# Patient Record
Sex: MALE | Race: WHITE | URBAN - METROPOLITAN AREA
[De-identification: names, ages, dates, MRNs, and addresses within clinical notes are randomized per-mention and may not be internally consistent; named-entity substitution may affect disease eponyms.]

---

## 2023-11-17 ENCOUNTER — OFFICE VISIT (OUTPATIENT)
Age: 68
End: 2023-11-17

## 2023-11-17 VITALS
HEART RATE: 66 BPM | SYSTOLIC BLOOD PRESSURE: 122 MMHG | HEIGHT: 68 IN | BODY MASS INDEX: 24.63 KG/M2 | WEIGHT: 162.5 LBS | RESPIRATION RATE: 16 BRPM | TEMPERATURE: 97.5 F | DIASTOLIC BLOOD PRESSURE: 72 MMHG | OXYGEN SATURATION: 98 %

## 2023-11-17 DIAGNOSIS — M51.16 LUMBAR DISC DISEASE WITH RADICULOPATHY: Primary | ICD-10-CM

## 2023-11-17 RX ORDER — LATANOPROST 50 UG/ML
SOLUTION/ DROPS OPHTHALMIC
COMMUNITY
Start: 2023-11-13

## 2023-11-17 RX ORDER — BRIMONIDINE TARTRATE 1 MG/ML
1 SOLUTION/ DROPS OPHTHALMIC
COMMUNITY
Start: 2022-03-14

## 2023-11-17 RX ORDER — DORZOLAMIDE HYDROCHLORIDE AND TIMOLOL MALEATE 20; 5 MG/ML; MG/ML
1 SOLUTION/ DROPS OPHTHALMIC 2 TIMES DAILY
COMMUNITY
Start: 2023-04-25

## 2023-11-17 SDOH — ECONOMIC STABILITY: HOUSING INSECURITY
IN THE LAST 12 MONTHS, WAS THERE A TIME WHEN YOU DID NOT HAVE A STEADY PLACE TO SLEEP OR SLEPT IN A SHELTER (INCLUDING NOW)?: NO

## 2023-11-17 SDOH — ECONOMIC STABILITY: FOOD INSECURITY: WITHIN THE PAST 12 MONTHS, YOU WORRIED THAT YOUR FOOD WOULD RUN OUT BEFORE YOU GOT MONEY TO BUY MORE.: NEVER TRUE

## 2023-11-17 SDOH — ECONOMIC STABILITY: INCOME INSECURITY: HOW HARD IS IT FOR YOU TO PAY FOR THE VERY BASICS LIKE FOOD, HOUSING, MEDICAL CARE, AND HEATING?: NOT HARD AT ALL

## 2023-11-17 SDOH — ECONOMIC STABILITY: FOOD INSECURITY: WITHIN THE PAST 12 MONTHS, THE FOOD YOU BOUGHT JUST DIDN'T LAST AND YOU DIDN'T HAVE MONEY TO GET MORE.: NEVER TRUE

## 2023-11-17 ASSESSMENT — PATIENT HEALTH QUESTIONNAIRE - PHQ9
SUM OF ALL RESPONSES TO PHQ QUESTIONS 1-9: 0
1. LITTLE INTEREST OR PLEASURE IN DOING THINGS: 0
2. FEELING DOWN, DEPRESSED OR HOPELESS: 0
SUM OF ALL RESPONSES TO PHQ QUESTIONS 1-9: 0
SUM OF ALL RESPONSES TO PHQ9 QUESTIONS 1 & 2: 0
SUM OF ALL RESPONSES TO PHQ QUESTIONS 1-9: 0
SUM OF ALL RESPONSES TO PHQ QUESTIONS 1-9: 0

## 2023-11-17 NOTE — PROGRESS NOTES
[facility]      Date of Visit:  2023  Patient Name: Arnol Sy   Patient :  1955     CHIEF COMPLAINT/HPI:     Arnol Sy is a 77 y.o. male who presents today for an general visit to be evaluated for the following condition(s):  Chief Complaint   Patient presents with    Check-Up     Patient is here today for back and hip pain both hips for 6-8 weeks now OTC medications not used    Over the last few years patient has had intermittent sharp stabbing pain radiating from the lumbar area to the groin right or left depending on his movement at the time. It would just be a jolt and then be gone and he be fine for a long period of time. Over the recent past he has noted increasing episodes of pain and while hiking felt sharp pain down the left leg which caused weakness he did fall at that time but he does not know if it was just because of the terrain or whether was because of the pain. At the present time he has no symptoms    REVIEW OF SYSTEM      Review of Systems   of fever no sweats no chills no incontinence no persistent weakness of the lower extremity    REVIEWED INFORMATION      No Known Allergies    Current Outpatient Medications   Medication Sig Dispense Refill    brimonidine (ALPHAGAN P) 0.1 % SOLN 1 drop      latanoprost (XALATAN) 0.005 % ophthalmic solution       dorzolamide-timolol (COSOPT) 2-0.5 % ophthalmic solution Apply 1 drop to eye 2 times daily      diclofenac (VOLTAREN) 50 MG EC tablet Take 1 tablet by mouth 2 times daily 60 tablet 1     No current facility-administered medications for this visit. There is no problem list on file for this patient. No past medical history on file. No past surgical history on file.      Social History     Socioeconomic History    Marital status: Unknown     Spouse name: None    Number of children: None    Years of education: None    Highest education level: None   Tobacco Use    Smoking status: Never    Smokeless

## 2023-11-20 ENCOUNTER — HOSPITAL ENCOUNTER (OUTPATIENT)
Age: 68
Setting detail: THERAPIES SERIES
Discharge: HOME | End: 2023-11-20
Payer: MEDICARE

## 2023-11-20 PROCEDURE — 97161 PT EVAL LOW COMPLEX 20 MIN: CPT

## 2023-11-20 PROCEDURE — 97110 THERAPEUTIC EXERCISES: CPT

## 2023-11-20 NOTE — CONSULTS
[] [address]  4600 [facility] [place] [facility].  [phone]  F: [phone] [] 204 [facility]  [address]  P: [phone]  F: [phone] [] [address]  151 [facility]  P: [phone]  F: [phone] [] [facility] [place]: [phone]  F: [phone] [] 224 [facility]  [address] [facility] [address]   P: [phone]  F: [phone]  [x] 26[address] [facility].   P: [phone]  F: [phone] [] 205 [facility]  [address]    [address]  P: [phone]  F: [phone] [] 224 [facility]  7[address] [facility]  [place]: [phone]  F: [phone] [] [address] [place]  [address]  [place]: [phone]  F: [phone]  [] 97 [facility]  [address] [facility] [address]  [place]: [phone]  F: [phone]     Physical Therapy Spine Evaluation    Date:  2023  Patient: Priscilla Solares  : 1955  MRN: [id number]  Physician: Edith Smith MD   Insurance: /MEDICAL vozero MEDICARE SUPPLEMENT:NO PRIOR AUTH REQUIRED, BASED ON MEDICAL NECESSITY PER MARISOL YR, MEDICAL MUTUAL COVERS PRIMARY DEDUCTIBLE AND 20% COINSURANCE, NO PATIENT LIABILITYMedical Diagnosis: M51.16 (ICD-10-CM) - Lumbar disc disease with radiculopathy  Rehab Codes: M54.16, S33.6XX, R26.9  Onset Date: 2023  Next 's appt.: 2023  Estimated  charges 23: $117.70  Subjective:   CC: B hip pain, dull ach LS and anterior  pelvis, at times shooting into

## 2023-11-22 ENCOUNTER — HOSPITAL ENCOUNTER (OUTPATIENT)
Age: 68
Setting detail: THERAPIES SERIES
Discharge: HOME | End: 2023-11-22
Payer: MEDICARE

## 2023-11-22 NOTE — FLOWSHEET NOTE
[x] Reviewed Prior HEP/Ed  Method of Education: [x] Verbal  [x] Demo  [x] Written  Comprehension of Education:  [x] Verbalizes understanding. [x] Demonstrates understanding. [] Needs review. [x] Demonstrates/verbalizes HEP/Ed previously given. Access Code: [email or web address]  URL: [email or web address]  Date: 11/22/2023  Prepared by: Ziyad Calabrese    Exercises  - Supine Transversus Abdominis Bracing - Hands on Stomach  - 1 x daily - 5 x weekly - 2 sets - 10 reps - 10 hold  - Hooklying Small March  - 1 x daily - 5 x weekly - 2 sets - 10 reps  - Modified Supine Leg Extension  - 1 x daily - 5 x weekly - 2 sets - 10 reps  - Supine Single Knee to Chest Stretch  - 1 x daily - 5 x weekly - 1 sets - 5 reps - 10 hold  - Supine Double Knee to Chest  - 1 x daily - 5 x weekly - 1 sets - 5 reps - 10 hold  - Supine Piriformis Stretch with Leg Straight  - 1 x daily - 5 x weekly - 1 sets - 5 reps - 10 hold  Plan: [x] Continue current frequency toward long and short term goals. [x] Specific Instructions for subsequent treatments: progress core strengthening flexion protocol, add extension as tolerated      Time In:1630            Time Out: 4229    Electronically signed by:   Ziyad Calabrese PT

## 2023-11-27 ENCOUNTER — HOSPITAL ENCOUNTER (OUTPATIENT)
Age: 68
Setting detail: THERAPIES SERIES
Discharge: HOME | End: 2023-11-27
Payer: MEDICARE

## 2023-11-27 PROCEDURE — 97110 THERAPEUTIC EXERCISES: CPT

## 2023-11-27 NOTE — FLOWSHEET NOTE
[] [address]  4600 [facility].  [phone]  F: [phone] [] 204 [facility]  [address]   [address]  P: [phone]  F: [phone] [] [address]  151 [facility]  P: [phone]  F: [phone] [] [place]: [phone]  F: [phone] [] 224 [facility]  One [facility]   [address]   P: [phone]  F: [phone]  [x] [address] [facility].   P: [phone]  F: [phone] [] 205 [facility]  [address] [place]    [address]  P: [phone]  F: [phone] [] 224 [facility]  [address] [facility]  [place]: [phone]  F: [phone] [] [address]  [place]: [phone]  F: [phone]      Physical Therapy Daily Treatment Note    Date:  2023  Patient Name:  Des Benson    :  1955  MRN: [id number]  Physician: Emmie Cardenas MD                                 Insurance: /MEDICAL [employer] MEDICARE SUPPLEMENT:NO PRIOR AUTH REQUIRED, BASED ON MEDICAL NECESSITY PER MARISOL YR, MEDICAL MUTUAL COVERS PRIMARY DEDUCTIBLE AND 20% COINSURANCE, NO PATIENT LIABILITYMedical Diagnosis: M51.16 (ICD-10-CM) - Lumbar disc disease with radiculopathy                      Rehab Codes: M54.16, S33.6XX, R26.9  Onset Date: 2023                      Next 's appt.: 2023  Estimated  charges 23: $259.53  Visit# / total visits: 3/18;                         Progress note for Medicare patient due at visit 10     Cancels/No Shows:

## 2023-11-29 ENCOUNTER — HOSPITAL ENCOUNTER (OUTPATIENT)
Age: 68
Setting detail: THERAPIES SERIES
Discharge: HOME | End: 2023-11-29
Payer: MEDICARE

## 2023-11-29 PROCEDURE — 97140 MANUAL THERAPY 1/> REGIONS: CPT

## 2023-11-29 PROCEDURE — 97110 THERAPEUTIC EXERCISES: CPT

## 2023-11-29 NOTE — FLOWSHEET NOTE
would continue to benefit from skilled physical therapy services in order to: address deficits as stated to restore difficulty initiating gait, sit to stand, lifting,       STG: (to be met in 10 treatments)  ? Pain: < 4/10 to facilitate ADL's  ? ROM: Lumbar extension to [facility] to facilitate standing  ? Strength: core to 4+/5 to facilitate lift and carry  ? Function: Patient able to perform sit to stand and walk without pain     LTG: (to be met in 18 treatments)  Improve KATHIA score to 0/100 to facilitate household chores         2. Patient to be independent with home exercise program as demonstrated by performance with correct form without cues. Patient goals:Abolish pain    Pt. Education:  [x] Yes  [] No  [x] Reviewed Prior HEP/Ed  Method of Education: [x] Verbal  [x] Demo  [x] Written  Comprehension of Education:  [x] Verbalizes understanding. [x] Demonstrates understanding. [] Needs review. [x] Demonstrates/verbalizes HEP/Ed previously given. Access Code: [email or web address]  URL: [employer]/  Date: 11/22/2023  Prepared by: Niya Rueda    Exercises  - Supine Transversus Abdominis Bracing - Hands on Stomach  - 1 x daily - 5 x weekly - 2 sets - 10 reps - 10 hold  - Hooklying Small March  - 1 x daily - 5 x weekly - 2 sets - 10 reps  - Modified Supine Leg Extension  - 1 x daily - 5 x weekly - 2 sets - 10 reps  - Supine Single Knee to Chest Stretch  - 1 x daily - 5 x weekly - 1 sets - 5 reps - 10 hold  - Supine Double Knee to Chest  - 1 x daily - 5 x weekly - 1 sets - 5 reps - 10 hold  - Supine Piriformis Stretch with Leg Straight  - 1 x daily - 5 x weekly - 1 sets - 5 reps - 10 hold  Plan: [x] Continue current frequency toward long and short term goals. [x] Specific Instructions for subsequent treatments: progress core strengthening flexion protocol, add extension as tolerated      Time In:0930            Time Out: 7538    Electronically signed by:   Niya Rueda PT

## 2023-12-04 ENCOUNTER — HOSPITAL ENCOUNTER (OUTPATIENT)
Age: 68
Setting detail: THERAPIES SERIES
Discharge: HOME | End: 2023-12-04
Payer: MEDICARE

## 2023-12-04 PROCEDURE — 97110 THERAPEUTIC EXERCISES: CPT

## 2023-12-04 PROCEDURE — 97140 MANUAL THERAPY 1/> REGIONS: CPT

## 2023-12-04 NOTE — FLOWSHEET NOTE
[] [address]  4600 [facility].  [phone]  F: [phone] [] 204 [facility]  [address]   [address]  P: [phone]  F: [phone] [] [address]  [address] [facility]  P: [phone]  F: [phone] [] [place]: [phone]  F: [phone] [] 224 [facility]  [address] [facility]   [address]   P: [phone]  F: [phone]  [x] [address] [facility].   P: [phone]  F: [phone] [] 205 [facility]  [address] [place]    [address]  P: [phone]  F: [phone] [] 224 [facility]  7[address] [facility]  [place]: [phone]  F: [phone] [] [address] [place]  [address]  [place]: [phone]  F: [phone]      Physical Therapy Daily Treatment Note    Date:  2023  Patient Name:  Tahmina Montero    :  1955  MRN: [id number]  Physician: Katie Chau MD                                 Insurance: /MEDICAL M[employer]UAL MEDICARE SUPPLEMENT:NO PRIOR AUTH REQUIRED, BASED ON MEDICAL NECESSITY PER MARISOL YR, MEDICAL MUTUAL COVERS PRIMARY DEDUCTIBLE AND 20% COINSURANCE, NO PATIENT LIABILITYMedical Diagnosis: M51.16 (ICD-10-CM) - Lumbar disc disease with radiculopathy                      Rehab Codes: M54.16, S33.6XX, R26.9  Onset Date: 2023                      Next 's appt.: 2023  Estimated MC charges 23: $399.93  Visit# / total visits: ;                         Progress note for Medicare patient due at visit 10     Cancels/No Shows:

## 2023-12-07 ENCOUNTER — HOSPITAL ENCOUNTER (OUTPATIENT)
Age: 68
Setting detail: THERAPIES SERIES
Discharge: HOME | End: 2023-12-07
Payer: MEDICARE

## 2023-12-07 PROCEDURE — 97110 THERAPEUTIC EXERCISES: CPT

## 2023-12-07 PROCEDURE — 97140 MANUAL THERAPY 1/> REGIONS: CPT

## 2023-12-07 NOTE — FLOWSHEET NOTE
*  [] [address]  4600 [facility] [place] [facility].  [phone]  F: [phone] [] 204 [facility]  [address]   [address]  P: [phone]  F: [phone] [] [address]  151 [facility]  P: [phone]  F: [phone] [] [place]: [phone]  F: [phone] [] [address]  One [facility]   [address]   P: [phone]  F: [phone]  [x] [address] [facility].   P: [phone]  F: [phone] [] 205 [facility]  [address] [place]    [address]  P: [phone]  F: [phone] [] 224 [facility]  7[address] [facility]  [place]: [phone]  F: [phone] [] [address] [place] [facility] [address]  [place]: [phone]  F: [phone]      Physical Therapy Daily Treatment Note    Date:  2023  Patient Name:  Sonja Davis    :  1955  MRN: [id number]  Physician: Lit Sheffield MD                                 Insurance: /MEDICAL [place] MEDICARE SUPPLEMENT:NO PRIOR AUTH REQUIRED, BASED ON MEDICAL NECESSITY PER MARISOL YR, MEDICAL MUTUAL COVERS PRIMARY DEDUCTIBLE AND 20% COINSURANCE, NO PATIENT LIABILITYMedical Diagnosis: M51.16 (ICD-10-CM) - Lumbar disc disease with radiculopathy                      Rehab Codes: M54.16, S33.6XX, R26.9  Onset Date: 2023                      Next 's appt.: 2023  Estimated MC charges 23: $470.13  Visit# / total visits: ;                         Progress note for Medicare patient due at visit 10     Cancels/No Shows:

## 2023-12-11 ENCOUNTER — HOSPITAL ENCOUNTER (OUTPATIENT)
Age: 68
Setting detail: THERAPIES SERIES
Discharge: HOME | End: 2023-12-11
Payer: MEDICARE

## 2023-12-11 PROCEDURE — 97110 THERAPEUTIC EXERCISES: CPT

## 2023-12-11 PROCEDURE — 97140 MANUAL THERAPY 1/> REGIONS: CPT

## 2023-12-11 NOTE — FLOWSHEET NOTE
strengthening flexion protocol, add extension as tolerated. Reassess iliosacral symmetry      Time In:1015            Time Out: 1100    Electronically signed by:   Viviana Rodriguez PT

## 2023-12-14 ENCOUNTER — HOSPITAL ENCOUNTER (OUTPATIENT)
Age: 68
Setting detail: THERAPIES SERIES
Discharge: HOME | End: 2023-12-14
Payer: MEDICARE

## 2023-12-14 PROCEDURE — 97110 THERAPEUTIC EXERCISES: CPT

## 2023-12-14 NOTE — PROGRESS NOTES
*  [] [address]  4600 [facility].  [phone]  F: [phone] [] 204 [facility]  [address]  P: [phone]  F: [phone] [] [address]  151 [facility]  P: [phone]  F: [phone] [] [place]: [phone]  F: [phone] [] 224 [facility]  [address]   P: [phone]  F: [phone]  [x] [address] [facility].   P: [phone]  F: [phone] [] 205 [facility]  [address]  P: [phone]  F: [phone] [] 224 [facility]  [address] [facility]  [place]: [phone]  F: [phone] [] [address] [facility]  [address]  [place]: [phone]  F: [phone]      Physical Therapy Daily Treatment Note/Progress Note    Date:  2023  Patient Name:  Blake Richardson    :  1955  MRN: [id number]  Physician: Carmen Vargas MD                                 Insurance: /MEDICAL M[employer]UAL MEDICARE SUPPLEMENT:NO PRIOR AUTH REQUIRED, BASED ON MEDICAL NECESSITY PER MARISOL YR, MEDICAL MUTUAL COVERS PRIMARY DEDUCTIBLE AND 20% COINSURANCE, NO PATIENT LIABILITYMedical Diagnosis: M51.16 (ICD-10-CM) - Lumbar disc disease with radiculopathy                      Rehab Codes: M54.16, S33.6XX, R26.9  Onset Date: 2023                      Next 's appt.: 2023  Estimated  charges 23: $611.96  Visit# / total visits: ;                         Progress note for Medicare patient due at visit

## 2023-12-19 PROBLEM — H40.51X3 NEOVASCULAR GLAUCOMA OF RIGHT EYE, SEVERE STAGE: Status: ACTIVE | Noted: 2022-01-21

## 2023-12-19 PROBLEM — H33.21 RIGHT RETINAL DETACHMENT: Status: ACTIVE | Noted: 2021-02-12

## 2023-12-19 PROBLEM — H35.21 PROLIFERATIVE VITREORETINOPATHY OF RIGHT EYE: Status: ACTIVE | Noted: 2021-04-26

## 2023-12-19 PROBLEM — H25.13 NUCLEAR SENILE CATARACT OF BOTH EYES: Status: ACTIVE | Noted: 2021-04-26

## 2024-05-20 ENCOUNTER — TELEPHONE (OUTPATIENT)
Age: 69
End: 2024-05-20

## 2024-05-20 DIAGNOSIS — Z12.5 SCREENING FOR PROSTATE CANCER: ICD-10-CM

## 2024-05-20 DIAGNOSIS — E78.2 MIXED HYPERLIPIDEMIA: Primary | ICD-10-CM

## 2024-05-20 NOTE — TELEPHONE ENCOUNTER
Patient has yearly visit with Dr AYALA on 5/29/24    He is wondering if he needs to have labs done prior?    If so - he wants to get these done tomorrow.    He was advised that Dr AYALA not in office today.    Patient said that if he can't get labs done tomorrow he will have to re schedule his apt because he is leaving out of town. Tomorrow is the only day he is available to get labs done    Patient wondering if anyone in the office would be able to order labs if he needs them?    Sending to provider in office today.    Notify patient    Patient will go to [facility]

## 2024-05-21 ENCOUNTER — HOSPITAL ENCOUNTER (OUTPATIENT)
Age: 69
Setting detail: SPECIMEN
Discharge: HOME | End: 2024-05-21

## 2024-05-21 DIAGNOSIS — Z12.5 SCREENING FOR PROSTATE CANCER: ICD-10-CM

## 2024-05-21 DIAGNOSIS — E78.2 MIXED HYPERLIPIDEMIA: ICD-10-CM

## 2024-05-21 LAB
ALBUMIN SERPL-MCNC: 4.7 G/DL (ref 3.5–5.2)
ALBUMIN/GLOB SERPL: 2 {RATIO} (ref 1–2.5)
ALP SERPL-CCNC: 79 U/L (ref 40–129)
ALT SERPL-CCNC: 20 U/L (ref 10–50)
ANION GAP SERPL CALCULATED.3IONS-SCNC: 6 MMOL/L (ref 9–16)
AST SERPL-CCNC: 31 U/L (ref 10–50)
BASOPHILS # BLD: 0.07 K/UL (ref 0–0.2)
BASOPHILS NFR BLD: 1 % (ref 0–2)
BILIRUB SERPL-MCNC: 0.6 MG/DL (ref 0–1.2)
BUN SERPL-MCNC: 17 MG/DL (ref 8–23)
CALCIUM SERPL-MCNC: 9.3 MG/DL (ref 8.6–10.4)
CHLORIDE SERPL-SCNC: 106 MMOL/L (ref 98–107)
CHOLEST SERPL-MCNC: 204 MG/DL (ref 0–199)
CHOLESTEROL/HDL RATIO: 5
CO2 SERPL-SCNC: 29 MMOL/L (ref 20–31)
CREAT SERPL-MCNC: 0.9 MG/DL (ref 0.7–1.2)
EOSINOPHIL # BLD: 0.3 K/UL (ref 0–0.44)
EOSINOPHILS RELATIVE PERCENT: 5 % (ref 1–4)
ERYTHROCYTE [DISTWIDTH] IN BLOOD BY AUTOMATED COUNT: 13.3 % (ref 11.8–14.4)
GFR, ESTIMATED: >90 ML/MIN/1.73M2
GLUCOSE SERPL-MCNC: 103 MG/DL (ref 74–99)
HCT VFR BLD AUTO: 40 % (ref 40.7–50.3)
HDLC SERPL-MCNC: 42 MG/DL
HGB BLD-MCNC: 13.8 G/DL (ref 13–17)
IMM GRANULOCYTES # BLD AUTO: <0.03 K/UL (ref 0–0.3)
IMM GRANULOCYTES NFR BLD: 0 %
LDLC SERPL CALC-MCNC: 139 MG/DL (ref 0–100)
LYMPHOCYTES NFR BLD: 2.11 K/UL (ref 1.1–3.7)
LYMPHOCYTES RELATIVE PERCENT: 35 % (ref 24–43)
MCH RBC QN AUTO: 35.1 PG (ref 25.2–33.5)
MCHC RBC AUTO-ENTMCNC: 34.5 G/DL (ref 28.4–34.8)
MCV RBC AUTO: 101.8 FL (ref 82.6–102.9)
MONOCYTES NFR BLD: 0.63 K/UL (ref 0.1–1.2)
MONOCYTES NFR BLD: 10 % (ref 3–12)
NEUTROPHILS NFR BLD: 49 % (ref 36–65)
NEUTS SEG NFR BLD: 2.93 K/UL (ref 1.5–8.1)
NRBC BLD-RTO: 0 PER 100 WBC
PLATELET # BLD AUTO: 260 K/UL (ref 138–453)
PMV BLD AUTO: 9.8 FL (ref 8.1–13.5)
POTASSIUM SERPL-SCNC: 4.4 MMOL/L (ref 3.7–5.3)
PROT SERPL-MCNC: 6.9 G/DL (ref 6.6–8.7)
PSA SERPL-MCNC: 5.5 NG/ML (ref 0–4)
RBC # BLD AUTO: 3.93 M/UL (ref 4.21–5.77)
SODIUM SERPL-SCNC: 141 MMOL/L (ref 136–145)
TRIGL SERPL-MCNC: 118 MG/DL
VLDLC SERPL CALC-MCNC: 24 MG/DL
WBC OTHER # BLD: 6.1 K/UL (ref 3.5–11.3)

## 2024-05-29 ENCOUNTER — OFFICE VISIT (OUTPATIENT)
Age: 69
End: 2024-05-29
Payer: MEDICARE

## 2024-05-29 VITALS
BODY MASS INDEX: 25.3 KG/M2 | TEMPERATURE: 98.4 F | WEIGHT: 166.4 LBS | DIASTOLIC BLOOD PRESSURE: 80 MMHG | RESPIRATION RATE: 14 BRPM | HEART RATE: 76 BPM | OXYGEN SATURATION: 98 % | SYSTOLIC BLOOD PRESSURE: 130 MMHG

## 2024-05-29 DIAGNOSIS — E78.2 MIXED HYPERLIPIDEMIA: ICD-10-CM

## 2024-05-29 DIAGNOSIS — R97.20 ELEVATED PSA: ICD-10-CM

## 2024-05-29 DIAGNOSIS — Z00.00 INITIAL MEDICARE ANNUAL WELLNESS VISIT: Primary | ICD-10-CM

## 2024-05-29 PROCEDURE — 1123F ACP DISCUSS/DSCN MKR DOCD: CPT | Performed by: FAMILY MEDICINE

## 2024-05-29 PROCEDURE — G0438 PPPS, INITIAL VISIT: HCPCS | Performed by: FAMILY MEDICINE

## 2024-05-29 PROCEDURE — 3017F COLORECTAL CA SCREEN DOC REV: CPT | Performed by: FAMILY MEDICINE

## 2024-05-29 RX ORDER — SULFACETAMIDE SODIUM 100 MG/ML
1 SOLUTION/ DROPS OPHTHALMIC 3 TIMES DAILY
Qty: 10 ML | Refills: 1 | Status: SHIPPED | OUTPATIENT
Start: 2024-05-29 | End: 2024-06-08

## 2024-05-29 SDOH — ECONOMIC STABILITY: FOOD INSECURITY: WITHIN THE PAST 12 MONTHS, YOU WORRIED THAT YOUR FOOD WOULD RUN OUT BEFORE YOU GOT MONEY TO BUY MORE.: NEVER TRUE

## 2024-05-29 SDOH — ECONOMIC STABILITY: FOOD INSECURITY: WITHIN THE PAST 12 MONTHS, THE FOOD YOU BOUGHT JUST DIDN'T LAST AND YOU DIDN'T HAVE MONEY TO GET MORE.: NEVER TRUE

## 2024-05-29 SDOH — ECONOMIC STABILITY: INCOME INSECURITY: HOW HARD IS IT FOR YOU TO PAY FOR THE VERY BASICS LIKE FOOD, HOUSING, MEDICAL CARE, AND HEATING?: NOT VERY HARD

## 2024-05-29 ASSESSMENT — PATIENT HEALTH QUESTIONNAIRE - PHQ9
SUM OF ALL RESPONSES TO PHQ QUESTIONS 1-9: 0
2. FEELING DOWN, DEPRESSED OR HOPELESS: NOT AT ALL
SUM OF ALL RESPONSES TO PHQ QUESTIONS 1-9: 0
1. LITTLE INTEREST OR PLEASURE IN DOING THINGS: NOT AT ALL
SUM OF ALL RESPONSES TO PHQ QUESTIONS 1-9: 0
SUM OF ALL RESPONSES TO PHQ QUESTIONS 1-9: 0
SUM OF ALL RESPONSES TO PHQ9 QUESTIONS 1 & 2: 0

## 2024-05-29 ASSESSMENT — LIFESTYLE VARIABLES
HOW OFTEN DO YOU HAVE A DRINK CONTAINING ALCOHOL: 2-3 TIMES A WEEK
HOW MANY STANDARD DRINKS CONTAINING ALCOHOL DO YOU HAVE ON A TYPICAL DAY: 1 OR 2

## 2024-05-29 NOTE — PROGRESS NOTES
[facility]     Date of Visit:  2024  Patient Name: Jerald Esposito Jr   Patient :  1955     CHIEF COMPLAINT/HPI:     Jerald Esposito Jr is a 69 y.o. male who presents today for an general visit to be evaluated for the following condition(s):  Chief Complaint   Patient presents with    Medicare AWV     Patient here for annual wellness visit.  He has been doing well continues to go to [facility] for evaluation of his glaucoma.  He is due for colonoscopy he would like to go to his wife's gastroenterologist and he will call us with contact information for referral.  His recent PSA went down to the low 5.  PSA back in  was 6.09.  He has had MRI of the prostate by his urologist in the past        Lab Results   Component Value Date/Time     2024 07:30 AM    K 4.4 2024 07:30 AM     2024 07:30 AM    CO2 29 2024 07:30 AM    BUN 17 2024 07:30 AM    CREATININE 0.9 2024 07:30 AM    GLUCOSE 103 2024 07:30 AM    CALCIUM 9.3 2024 07:30 AM    LABGLOM >90 2024 07:30 AM    LABGLOM >60 2023 08:49 AM        No results found for: \"MALBCR\"     Lab Results   Component Value Date    CHOL 204 (H) 2024    TRIG 118 2024    HDL 42 2024    VLDL 24 2024    CHOLHDLRATIO 5.0 2024        Lab Results   Component Value Date    WBC 6.1 2024    HGB 13.8 2024    HCT 40.0 (L) 2024    .8 2024     2024       Lab Results   Component Value Date    ALT 20 2024    AST 31 2024    ALKPHOS 79 2024    BILITOT 0.6 2024            REVIEW OF SYSTEM      Review of Systems  No fever no sweats no chills no chest pain no shortness of breath no nausea vomiting no diarrhea patient is quite active physically.  No edema issues    REVIEWED INFORMATION      No Known Allergies    Current Outpatient Medications   Medication Sig Dispense Refill    sulfacetamide (BLEPH-10) 10 %

## 2024-05-29 NOTE — PATIENT INSTRUCTIONS
recommended every 1-2 years to screen for glaucoma; cataracts, macular degeneration, and other eye disorders.  A preventive dental visit is recommended every 6 months.  Try to get at least 150 minutes of exercise per week or 10,000 steps per day on a pedometer .  Order or download the FREE \"Exercise & Physical Activity: Your Everyday Guide\" from [employer] [place] [employer]. Call 1-922.735.1616 or search [employer] [place] [employer] online.  You need 7120-4870 mg of calcium and 2495-9113 IU of vitamin D per day. It is possible to meet your calcium requirement with diet alone, but a vitamin D supplement is usually necessary to meet this goal.  When exposed to the sun, use a sunscreen that protects against both UVA and UVB radiation with an SPF of 30 or greater. Reapply every 2 to 3 hours or after sweating, drying off with a towel, or swimming.  Always wear a seat belt when traveling in a car. Always wear a helmet when riding a bicycle or motorcycle.

## 2024-05-30 ENCOUNTER — TELEPHONE (OUTPATIENT)
Age: 69
End: 2024-05-30

## 2024-05-30 DIAGNOSIS — Z12.11 COLON CANCER SCREENING: Primary | ICD-10-CM

## 2024-05-30 NOTE — TELEPHONE ENCOUNTER
Patient said he was to call back with the name of the doctor he would like to have his colonoscopy with.  He would like to see Dr. Dipesh Jorgensen.

## 2025-07-16 ENCOUNTER — TELEPHONE (OUTPATIENT)
Age: 70
End: 2025-07-16

## 2025-07-16 DIAGNOSIS — Z12.5 SCREENING FOR PROSTATE CANCER: ICD-10-CM

## 2025-07-16 DIAGNOSIS — E78.2 MIXED HYPERLIPIDEMIA: Primary | ICD-10-CM

## 2025-07-16 DIAGNOSIS — M51.16 LUMBAR DISC DISEASE WITH RADICULOPATHY: ICD-10-CM

## 2025-07-17 ENCOUNTER — HOSPITAL ENCOUNTER (OUTPATIENT)
Age: 70
Setting detail: SPECIMEN
Discharge: HOME | End: 2025-07-17

## 2025-07-17 DIAGNOSIS — Z12.5 SCREENING FOR PROSTATE CANCER: ICD-10-CM

## 2025-07-17 DIAGNOSIS — E78.2 MIXED HYPERLIPIDEMIA: ICD-10-CM

## 2025-07-17 DIAGNOSIS — M51.16 LUMBAR DISC DISEASE WITH RADICULOPATHY: ICD-10-CM

## 2025-07-17 LAB
ALBUMIN SERPL-MCNC: 4.6 G/DL (ref 3.5–5.2)
ALBUMIN/GLOB SERPL: 1.8 {RATIO} (ref 1–2.5)
ALP SERPL-CCNC: 78 U/L (ref 40–129)
ALT SERPL-CCNC: 27 U/L (ref 10–50)
ANION GAP SERPL CALCULATED.3IONS-SCNC: 11 MMOL/L (ref 9–16)
AST SERPL-CCNC: 29 U/L (ref 10–50)
BILIRUB DIRECT SERPL-MCNC: 0.2 MG/DL (ref 0–0.2)
BILIRUB INDIRECT SERPL-MCNC: 0.3 MG/DL (ref 0–1)
BILIRUB SERPL-MCNC: 0.5 MG/DL (ref 0–1.2)
BUN SERPL-MCNC: 18 MG/DL (ref 8–23)
CALCIUM SERPL-MCNC: 9.7 MG/DL (ref 8.6–10.4)
CHLORIDE SERPL-SCNC: 103 MMOL/L (ref 98–107)
CHOLEST SERPL-MCNC: 212 MG/DL (ref 0–199)
CHOLESTEROL/HDL RATIO: 5.3
CO2 SERPL-SCNC: 27 MMOL/L (ref 20–31)
CREAT SERPL-MCNC: 1 MG/DL (ref 0.7–1.2)
ERYTHROCYTE [DISTWIDTH] IN BLOOD BY AUTOMATED COUNT: 13.9 % (ref 11.8–14.4)
GFR, ESTIMATED: 80 ML/MIN/1.73M2
GLUCOSE SERPL-MCNC: 100 MG/DL (ref 74–99)
HCT VFR BLD AUTO: 40.3 % (ref 40.7–50.3)
HDLC SERPL-MCNC: 40 MG/DL
HGB BLD-MCNC: 14 G/DL (ref 13–17)
LDLC SERPL CALC-MCNC: 150 MG/DL (ref 0–100)
MCH RBC QN AUTO: 35.4 PG (ref 25.2–33.5)
MCHC RBC AUTO-ENTMCNC: 34.7 G/DL (ref 28.4–34.8)
MCV RBC AUTO: 102 FL (ref 82.6–102.9)
NRBC BLD-RTO: 0 PER 100 WBC
PLATELET # BLD AUTO: 280 K/UL (ref 138–453)
PMV BLD AUTO: 10.1 FL (ref 8.1–13.5)
POTASSIUM SERPL-SCNC: 4.5 MMOL/L (ref 3.7–5.3)
PROT SERPL-MCNC: 7.1 G/DL (ref 6.6–8.7)
PSA SERPL-MCNC: 7.16 NG/ML (ref 0–4)
RBC # BLD AUTO: 3.95 M/UL (ref 4.21–5.77)
SODIUM SERPL-SCNC: 141 MMOL/L (ref 136–145)
TRIGL SERPL-MCNC: 111 MG/DL
VLDLC SERPL CALC-MCNC: 22 MG/DL (ref 1–30)
WBC OTHER # BLD: 6.8 K/UL (ref 3.5–11.3)

## 2025-07-24 ENCOUNTER — OFFICE VISIT (OUTPATIENT)
Age: 70
End: 2025-07-24

## 2025-07-24 VITALS
HEIGHT: 68 IN | SYSTOLIC BLOOD PRESSURE: 116 MMHG | WEIGHT: 166 LBS | OXYGEN SATURATION: 96 % | HEART RATE: 67 BPM | DIASTOLIC BLOOD PRESSURE: 84 MMHG | TEMPERATURE: 98.9 F | BODY MASS INDEX: 25.16 KG/M2

## 2025-07-24 DIAGNOSIS — L98.9 SKIN LESION OF FACE: ICD-10-CM

## 2025-07-24 DIAGNOSIS — Z12.5 PROSTATE CANCER SCREENING: ICD-10-CM

## 2025-07-24 DIAGNOSIS — Z00.00 MEDICARE ANNUAL WELLNESS VISIT, SUBSEQUENT: Primary | ICD-10-CM

## 2025-07-24 DIAGNOSIS — H40.1132 PRIMARY OPEN ANGLE GLAUCOMA (POAG) OF BOTH EYES, MODERATE STAGE: ICD-10-CM

## 2025-07-24 SDOH — ECONOMIC STABILITY: FOOD INSECURITY: WITHIN THE PAST 12 MONTHS, YOU WORRIED THAT YOUR FOOD WOULD RUN OUT BEFORE YOU GOT MONEY TO BUY MORE.: NEVER TRUE

## 2025-07-24 SDOH — ECONOMIC STABILITY: FOOD INSECURITY: WITHIN THE PAST 12 MONTHS, THE FOOD YOU BOUGHT JUST DIDN'T LAST AND YOU DIDN'T HAVE MONEY TO GET MORE.: NEVER TRUE

## 2025-07-24 ASSESSMENT — PATIENT HEALTH QUESTIONNAIRE - PHQ9
SUM OF ALL RESPONSES TO PHQ QUESTIONS 1-9: 0
1. LITTLE INTEREST OR PLEASURE IN DOING THINGS: NOT AT ALL
SUM OF ALL RESPONSES TO PHQ QUESTIONS 1-9: 0
2. FEELING DOWN, DEPRESSED OR HOPELESS: NOT AT ALL

## 2025-07-24 ASSESSMENT — LIFESTYLE VARIABLES
HOW MANY STANDARD DRINKS CONTAINING ALCOHOL DO YOU HAVE ON A TYPICAL DAY: 1 OR 2
HOW OFTEN DO YOU HAVE A DRINK CONTAINING ALCOHOL: 2-4 TIMES A MONTH

## 2025-07-24 NOTE — PROGRESS NOTES
Medicare Annual Wellness Visit    Jerald Esposito Jr is here for Medicare AWV (Care teams reviewed. Discoloration on right cheek noticed 6 months ago. Labs done on 7/17. )    Assessment & Plan   Medicare annual wellness visit, subsequent  Prostate cancer screening  -     PSA Screening; Future  Skin lesion of face  -     Ashlee Gomez MD, Dermatology, [place]  Primary open angle glaucoma (POAG) of both eyes, moderate stage     Return in 1 year (on 7/24/2026) for Medicare AWV.   Labs were reviewed.  A low-cholesterol food list was issued.  Patient will repeat PSA in 6 months.  He has been evaluated by urology in the past for similar findings.     Subjective       Patient's complete Health Risk Assessment and screening values have been reviewed and are found in Flowsheets. The following problems were reviewed today and where indicated follow up appointments were made and/or referrals ordered.    Positive Risk Factor Screenings with Interventions:                    Safety:  Do you have any tripping hazards - loose or unsecured carpets or rugs?: (!) Yes  Do you have non-slip mats or non-slip surfaces or shower bars or grab bars in your shower or bathtub?: (!) No  Interventions:  Fall prevention documents issued.  Patient has not had any falls                Lab Results   Component Value Date/Time     07/17/2025 07:45 AM    K 4.5 07/17/2025 07:45 AM     07/17/2025 07:45 AM    CO2 27 07/17/2025 07:45 AM    BUN 18 07/17/2025 07:45 AM    CREATININE 1.0 07/17/2025 07:45 AM    GLUCOSE 100 07/17/2025 07:45 AM    CALCIUM 9.7 07/17/2025 07:45 AM    LABGLOM 80 07/17/2025 07:45 AM    LABGLOM >60 04/14/2023 08:49 AM          Lab Results   Component Value Date    CHOL 212 (H) 07/17/2025    TRIG 111 07/17/2025    HDL 40 (L) 07/17/2025    VLDL 22 07/17/2025    CHOLHDLRATIO 5.3 (H) 07/17/2025        Lab Results   Component Value Date    WBC 6.8 07/17/2025    HGB 14.0 07/17/2025    HCT 40.3 (L) 07/17/2025    .0